# Patient Record
Sex: FEMALE | Race: WHITE | HISPANIC OR LATINO | Employment: FULL TIME | URBAN - METROPOLITAN AREA
[De-identification: names, ages, dates, MRNs, and addresses within clinical notes are randomized per-mention and may not be internally consistent; named-entity substitution may affect disease eponyms.]

---

## 2017-02-02 ENCOUNTER — OFFICE VISIT (OUTPATIENT)
Dept: MEDICAL GROUP | Facility: CLINIC | Age: 37
End: 2017-02-02
Payer: COMMERCIAL

## 2017-02-02 VITALS
OXYGEN SATURATION: 97 % | DIASTOLIC BLOOD PRESSURE: 76 MMHG | HEIGHT: 70 IN | HEART RATE: 65 BPM | SYSTOLIC BLOOD PRESSURE: 124 MMHG | RESPIRATION RATE: 16 BRPM | WEIGHT: 174 LBS | BODY MASS INDEX: 24.91 KG/M2 | TEMPERATURE: 97.4 F

## 2017-02-02 DIAGNOSIS — R19.5 MUCUS IN STOOL: ICD-10-CM

## 2017-02-02 DIAGNOSIS — Z87.19 HISTORY OF RECTAL BLEEDING: ICD-10-CM

## 2017-02-02 PROCEDURE — 99213 OFFICE O/P EST LOW 20 MIN: CPT | Performed by: NURSE PRACTITIONER

## 2017-02-02 ASSESSMENT — PATIENT HEALTH QUESTIONNAIRE - PHQ9: CLINICAL INTERPRETATION OF PHQ2 SCORE: 0

## 2017-02-02 NOTE — MR AVS SNAPSHOT
"India Hernandez   2017 7:40 AM   Office Visit   MRN: [id number]    Department:  [facility]   Dept Phone:  [phone]    Description:  Female : 1980   Provider:  FRANCISCO Grimes           Reason for Visit     Rectal Bleeding over last week, per pt it usually happens when pt eats spicy foods      Allergies as of 2017     No Known Allergies      You were diagnosed with     History of rectal bleeding   [045336]       Mucus in stool   [421032]         Vital Signs     Blood Pressure Pulse Temperature Respirations Height Weight    124/76 mmHg 65 36.3 °C (97.4 °F) 16 1.778 m (5' 10\") 78.926 kg (174 lb)    Body Mass Index Oxygen Saturation Last Menstrual Period Breastfeeding? Smoking Status       24.97 kg/m2 97% 2017 No Never Smoker        Basic Information     Date Of Birth Sex Race Ethnicity Preferred Language    1980 Female White  Origin (Sami,Sri Lankan,Citizen of [place],Raza, etc) English      Problem List              ICD-10-CM Priority Class Noted - Resolved    Uses birth control Z30.9   2015 - Present      Health Maintenance        Date Due Completion Dates    IMM DTaP/Tdap/Td Vaccine (1 - Tdap) 2001 ---    IMM INFLUENZA (1) 2016 10/16/2015    PAP SMEAR 3/1/2018 3/1/2015 (Done), 2014 (Done)    Override on 3/1/2015: Done (NL; at GYN)    Override on 2014: Done (NL/Gyn=[facility].)            Current Immunizations     Influenza TIV (IM) 10/16/2015      Below and/or attached are the medications your provider expects you to take. Review all of your home medications and newly ordered medications with your provider and/or pharmacist. Follow medication instructions as directed by your provider and/or pharmacist. Please keep your medication list with you and share with your provider. Update the information when medications are discontinued, doses are changed, or new medications (including over-the-counter products) are added; and carry medication " information at all times in the event of emergency situations     Allergies:  No Known Allergies          Medications  Valid as of: February 02, 2017 -  8:16 AM    Generic Name Brand Name Tablet Size Instructions for use    Norethindrone Acet-Ethinyl Est (Tab) MICROGESTIN 1/20 1-20 MG-MCG Take 1 Tab by mouth every day.        .                 Medicines prescribed today were sent to:     [facility]/PHARMACY #[id number] - [place], [place] - [address] [facility]    [address] [facility] [place] [address]    Phone: [phone] Fax: [phone]    Open 24 Hours?: No    [id number] - [place], [place] - [address]    [address] [place] [address]    Phone: [phone] Fax: [phone]    Open 24 Hours?: No      Medication refill instructions:       If your prescription bottle indicates you have medication refills left, it is not necessary to call your provider’s office. Please contact your pharmacy and they will refill your medication.    If your prescription bottle indicates you do not have any refills left, you may request refills at any time through one of the following ways: The online ProFounder system (except Urgent Care), by calling your provider’s office, or by asking your pharmacy to contact your provider’s office with a refill request. Medication refills are processed only during regular business hours and may not be available until the next business day. Your provider may request additional information or to have a follow-up visit with you prior to refilling your medication.   *Please Note: Medication refills are assigned a new Rx number when refilled electronically. Your pharmacy may indicate that no refills were authorized even though a new prescription for the same medication is available at the pharmacy. Please request the medicine by name with the pharmacy before contacting your provider for a refill.        Referral     A referral request has been sent to our patient care coordination department. Please allow 3-5  business days for us to process this request and contact you either by phone or mail. If you do not hear from us by the 5th business day, please call us at [phone].           [employer] Access Code: Activation code not generated  Current [employer] Status: Active

## 2017-02-02 NOTE — PROGRESS NOTES
"CC: Rectal Bleeding        HPI:     India presents today for the followin. History of rectal bleeding/Mucus in stool  Here is only intermittently whenever she eats really spicy foods she'll notice some jessie blood in her stools. No pain with bowel movement. No associated diarrhea. Some associated bloating. No abdominal pain associated. Usually will self resolve after one or 2 stools. Over the last week she did have some nitroglycerin hot wings and she states she continued to have some jessie blood with her bowel movements and some associated mucus for several days this concerned her so she called and made an appointment. Again she denied having any diarrhea or abdominal pain.    She hasn't had any of this in several days at this point.    She describes the stools as being brown. The stools themselves were not bloody or black. She states there was some blood on the stool. There was some blood in the toilet bowl. There was some maybe on the toilet paper. The mucus and blood would be what sounds like with the passage of the stool and not incorporated with the stool.    Current Outpatient Prescriptions   Medication Sig Dispense Refill   • norethindrone-ethinyl estradiol (MICROGESTIN ) 1-20 MG-MCG per tablet Take 1 Tab by mouth every day. 84 Tab 3     No current facility-administered medications for this visit.     Social History   Substance Use Topics   • Smoking status: Never Smoker    • Smokeless tobacco: Never Used   • Alcohol Use: No      Comment: monthly     I reviewed patients allergies, problem list and medications today in [facility].    ROS: Any/all pertinent positives listed in the HPI, otherwise all others reviewed are negative today.      /76 mmHg  Pulse 65  Temp(Src) 36.3 °C (97.4 °F)  Resp 16  Ht 1.778 m (5' 10\")  Wt 78.926 kg (174 lb)  BMI 24.97 kg/m2  SpO2 97%  LMP 2017  Breastfeeding? No    Exam:    Gen: Alert and oriented, No apparent distress. WDWN  Psych: A+Ox3, normal affect " and mood  Skin: Warm, dry and intact. Good turgor   No rashes in visible areas.  Eye: Conjunctiva clear, lids normal  ENMT: Lips without lesions, good dentition  Lungs: Unlabored respiratory effort.   Abd: Soft non tender, non distended. Normal active bowel sounds.    No Hepatosplenomegaly, No pulsatile masses.   Rectal exam doesn't reveal any fissures or hemorrhoids.  Fit: Negative      Assessment and Plan.   36 y.o. female with the following issues.    1. History of rectal bleeding//Mucus in stool  Stable. Currently asymptomatic. Will have her follow up with GI. Will have her go to a bland non-spicy diet in the meantime.   - REFERRAL TO GASTROENTEROLOGY      Over 50% of this 15 minute visit was spent on counseling and coordination of care regarding  review of her history and today's plan of care.

## 2017-03-23 ENCOUNTER — OFFICE VISIT (OUTPATIENT)
Dept: MEDICAL GROUP | Facility: CLINIC | Age: 37
End: 2017-03-23
Payer: COMMERCIAL

## 2017-03-23 VITALS
SYSTOLIC BLOOD PRESSURE: 124 MMHG | TEMPERATURE: 98.2 F | HEART RATE: 72 BPM | DIASTOLIC BLOOD PRESSURE: 62 MMHG | WEIGHT: 173 LBS | RESPIRATION RATE: 16 BRPM | BODY MASS INDEX: 24.77 KG/M2 | HEIGHT: 70 IN | OXYGEN SATURATION: 97 %

## 2017-03-23 DIAGNOSIS — R09.89 SINUS SYMPTOM: ICD-10-CM

## 2017-03-23 DIAGNOSIS — R05.9 COUGH: ICD-10-CM

## 2017-03-23 PROCEDURE — 99214 OFFICE O/P EST MOD 30 MIN: CPT | Performed by: NURSE PRACTITIONER

## 2017-03-23 RX ORDER — CODEINE PHOSPHATE AND GUAIFENESIN 10; 100 MG/5ML; MG/5ML
5 SOLUTION ORAL NIGHTLY PRN
Qty: 75 ML | Refills: 0 | Status: SHIPPED | OUTPATIENT
Start: 2017-03-23 | End: 2017-05-08

## 2017-03-23 RX ORDER — BENZONATATE 100 MG/1
100 CAPSULE ORAL 3 TIMES DAILY PRN
Qty: 60 CAP | Refills: 0 | Status: SHIPPED | OUTPATIENT
Start: 2017-03-23 | End: 2017-05-08

## 2017-03-23 RX ORDER — AZITHROMYCIN 250 MG/1
TABLET, FILM COATED ORAL
Qty: 1 QUANTITY SUFFICIENT | Refills: 0 | Status: SHIPPED | OUTPATIENT
Start: 2017-03-23 | End: 2017-05-08

## 2017-03-23 RX ORDER — FLUTICASONE PROPIONATE 50 MCG
2 SPRAY, SUSPENSION (ML) NASAL DAILY
Qty: 1 BOTTLE | Refills: 11 | Status: SHIPPED | OUTPATIENT
Start: 2017-03-23 | End: 2021-05-03

## 2017-03-23 NOTE — PROGRESS NOTES
"CC: Nasal Congestion        HPI:     India presents today for the followin. Sinus symptom/Cough  Here today complaining of a cough sometimes productive sometimes dry for the last week. When productive with yellowish-green sputum. Green drainage out of the nose. Positive congestion and postnasal drainage. Occasional discomfort most common in the right ear more than the left. No fevers during this week.  Coughs so much that her back hurts.  Missed 2 days of work, now back at work  Tried TheraFlu. Thinks it makes her sweat.    Current Outpatient Prescriptions   Medication Sig Dispense Refill   • fluticasone (FLONASE) 50 MCG/ACT nasal spray Spray 2 Sprays in nose every day. 1 Bottle 11   • benzonatate (TESSALON) 100 MG Cap Take 1 Cap by mouth 3 times a day as needed for Cough. 60 Cap 0   • guaifenesin-codeine (ROBITUSSIN AC) Solution oral solution Take 5 mL by mouth at bedtime as needed for Cough. 75 mL 0   • azithromycin (ZITHROMAX) 250 MG Tab Per packet 1 Quantity Sufficient 0   • norethindrone-ethinyl estradiol (MICROGESTIN ) 1-20 MG-MCG per tablet Take 1 Tab by mouth every day. 84 Tab 3     No current facility-administered medications for this visit.     Social History   Substance Use Topics   • Smoking status: Never Smoker    • Smokeless tobacco: Never Used   • Alcohol Use: No      Comment: monthly     I reviewed patients allergies, problem list and medications today in [facility].    ROS: Any/all pertinent positives listed in the HPI, otherwise all others reviewed are negative today.      /62 mmHg  Pulse 72  Temp(Src) 36.8 °C (98.2 °F)  Resp 16  Ht 1.778 m (5' 10\")  Wt 78.472 kg (173 lb)  BMI 24.82 kg/m2  SpO2 97%  LMP 2017  Breastfeeding? No    Exam:    Gen: Alert and oriented, No apparent distress. WDWN  Psych: A+Ox3, normal affect and mood  Skin: Warm, dry and intact. Good turgor   No rashes in visible areas.  Eye: Conjunctiva clear, lids normal  ENMT: Lips without lesions, good " dentition   Oropharynx clear. TMs unremarkable bilaterally. No purulent pressure over the frontal or maxillary sinuses bilaterally. Mild erythema bilateral nasal turbinates  Neck: No Lymphadenopathy, Thyromegaly, Bruits.   Trachea midline, no masses  Lungs: Clear to auscultation bilaterally, no rales or rhonchi   Unlabored respiratory effort.   CV: Regular rate and rhythm, S1, S2. No murmurs.   No Edema         Assessment and Plan.   37 y.o. female with the following issues.    1. Sinus symptom/Cough  Discussed importance of fluids, rest and hand hygiene.  May use over-the-counter anti-pyuretics and/or antitussives as needed.  Return to the office if necessary temperature, symptoms aren't resolving or new symptoms. Medications as below. Robitussin cough syrup for night only. Do not drive or mix alcohol with this. Discussed peterson pot sinus rinse and how to use this., Flonase. She'll be now she's not improving.  - fluticasone (FLONASE) 50 MCG/ACT nasal spray; Spray 2 Sprays in nose every day.  Dispense: 1 Bottle; Refill: 11  - benzonatate (TESSALON) 100 MG Cap; Take 1 Cap by mouth 3 times a day as needed for Cough.  Dispense: 60 Cap; Refill: 0  - guaifenesin-codeine (ROBITUSSIN AC) Solution oral solution; Take 5 mL by mouth at bedtime as needed for Cough.  Dispense: 75 mL; Refill: 0  - azithromycin (ZITHROMAX) 250 MG Tab; Per packet  Dispense: 1 Quantity Sufficient; Refill: 0     reviewed from state pharmacy database-Medications found to be medically necessary/appropriate.

## 2017-03-23 NOTE — MR AVS SNAPSHOT
"India Hernandez   3/23/2017 8:00 AM   Office Visit   MRN: [id number]    Department:  [facility]   Dept Phone:  [phone]    Description:  Female : 1980   Provider:  FRANCISCO Grimes           Reason for Visit     Nasal Congestion cough since last thursday      Allergies as of 3/23/2017     No Known Allergies      You were diagnosed with     Sinus symptom   [053862]       Cough   [786.2.ICD-9-CM]         Vital Signs     Blood Pressure Pulse Temperature Respirations Height Weight    124/62 mmHg 72 36.8 °C (98.2 °F) 16 1.778 m (5' 10\") 78.472 kg (173 lb)    Body Mass Index Oxygen Saturation Last Menstrual Period Breastfeeding? Smoking Status       24.82 kg/m2 97% 2017 No Never Smoker        Basic Information     Date Of Birth Sex Race Ethnicity Preferred Language    1980 Female White  Origin (Kosovan,Romanian,Bahamian,Raza, etc) English      Problem List              ICD-10-CM Priority Class Noted - Resolved    Uses birth control Z30.9   2015 - Present      Health Maintenance        Date Due Completion Dates    IMM DTaP/Tdap/Td Vaccine (1 - Tdap) 2001 ---    IMM INFLUENZA (1) 2016 10/16/2015    PAP SMEAR 3/1/2018 3/1/2015 (Done), 2014 (Done)    Override on 3/1/2015: Done (NL; at GYN)    Override on 2014: Done (NL/Gyn=[facility].)            Current Immunizations     Influenza TIV (IM) 10/16/2015      Below and/or attached are the medications your provider expects you to take. Review all of your home medications and newly ordered medications with your provider and/or pharmacist. Follow medication instructions as directed by your provider and/or pharmacist. Please keep your medication list with you and share with your provider. Update the information when medications are discontinued, doses are changed, or new medications (including over-the-counter products) are added; and carry medication information at all times in the event of emergency " situations     Allergies:  No Known Allergies          Medications  Valid as of: March 23, 2017 -  8:19 AM    Generic Name Brand Name Tablet Size Instructions for use    Azithromycin (Tab) ZITHROMAX 250 MG Per packet        Benzonatate (Cap) TESSALON 100 MG Take 1 Cap by mouth 3 times a day as needed for Cough.        Fluticasone Propionate (Suspension) FLONASE 50 MCG/ACT Spray 2 Sprays in nose every day.        Guaifenesin-Codeine (Solution) ROBITUSSIN -10 mg/5mL Take 5 mL by mouth at bedtime as needed for Cough.        Norethindrone Acet-Ethinyl Est (Tab) MICROGESTIN 1/20 1-20 MG-MCG Take 1 Tab by mouth every day.        .                 Medicines prescribed today were sent to:     [facility]/PHARMACY #[id number] - [place] - [address]    [address] [facility] [address] [facility] [address]    Phone: [phone] Fax: [phone]    Open 24 Hours?: No    [facility]/PHARMACY #[id number]  [place] - [address] [facility]    [address] [facility] [address]    Phone: [phone] Fax: [phone]    Open 24 Hours?: No      Medication refill instructions:       If your prescription bottle indicates you have medication refills left, it is not necessary to call your provider’s office. Please contact your pharmacy and they will refill your medication.    If your prescription bottle indicates you do not have any refills left, you may request refills at any time through one of the following ways: The online Dailymotion system (except Urgent Care), by calling your provider’s office, or by asking your pharmacy to contact your provider’s office with a refill request. Medication refills are processed only during regular business hours and may not be available until the next business day. Your provider may request additional information or to have a follow-up visit with you prior to refilling your medication.   *Please Note: Medication refills are assigned a new Rx number when refilled electronically. Your pharmacy may indicate that no refills were  authorized even though a new prescription for the same medication is available at the pharmacy. Please request the medicine by name with the pharmacy before contacting your provider for a refill.           [employer]Depothart Access Code: Activation code not generated  Current [employer] Status: Active

## 2017-05-01 RX ORDER — NORETHINDRONE ACETATE AND ETHINYL ESTRADIOL .02; 1 MG/1; MG/1
TABLET ORAL
Qty: 84 TAB | Refills: 3 | Status: SHIPPED | OUTPATIENT
Start: 2017-05-01 | End: 2018-04-09 | Stop reason: SDUPTHER

## 2017-05-08 ENCOUNTER — OFFICE VISIT (OUTPATIENT)
Dept: MEDICAL GROUP | Facility: CLINIC | Age: 37
End: 2017-05-08
Payer: COMMERCIAL

## 2017-05-08 VITALS
HEIGHT: 70 IN | RESPIRATION RATE: 14 BRPM | SYSTOLIC BLOOD PRESSURE: 106 MMHG | DIASTOLIC BLOOD PRESSURE: 64 MMHG | HEART RATE: 56 BPM | WEIGHT: 172 LBS | BODY MASS INDEX: 24.62 KG/M2 | TEMPERATURE: 98.4 F | OXYGEN SATURATION: 98 %

## 2017-05-08 DIAGNOSIS — L81.1 MELASMA: ICD-10-CM

## 2017-05-08 DIAGNOSIS — Z30.09 BIRTH CONTROL COUNSELING: ICD-10-CM

## 2017-05-08 PROCEDURE — 99213 OFFICE O/P EST LOW 20 MIN: CPT | Performed by: NURSE PRACTITIONER

## 2017-05-08 NOTE — MR AVS SNAPSHOT
"        India Hernandez   2017 5:00 PM   Office Visit   MRN: [id number]    Department:  [facility]   Dept Phone:  [phone]    Description:  Female : 1980   Provider:  FRANCISCO Grimes           Reason for Visit     Contraception IUD       Allergies as of 2017     No Known Allergies      You were diagnosed with     Birth control counseling   [023134]         Vital Signs     Blood Pressure Pulse Temperature Respirations Height Weight    106/64 mmHg 56 36.9 °C (98.4 °F) 14 1.778 m (5' 10\") 78.019 kg (172 lb)    Body Mass Index Oxygen Saturation Last Menstrual Period Breastfeeding? Smoking Status       24.68 kg/m2 98% 2017 No Never Smoker        Basic Information     Date Of Birth Sex Race Ethnicity Preferred Language    1980 Female White  Origin (Eritrean,Ethiopian,Peruvian,American, etc) English      Problem List              ICD-10-CM Priority Class Noted - Resolved    Uses birth control Z30.9   2015 - Present      Health Maintenance        Date Due Completion Dates    IMM DTaP/Tdap/Td Vaccine (1 - Tdap) 2001 ---    PAP SMEAR 3/1/2018 3/1/2015 (Done), 2014 (Done)    Override on 3/1/2015: Done (NL; at GYN)    Override on 2014: Done (NL/Gyn=[facility].)            Current Immunizations     Influenza TIV (IM) 10/16/2015      Below and/or attached are the medications your provider expects you to take. Review all of your home medications and newly ordered medications with your provider and/or pharmacist. Follow medication instructions as directed by your provider and/or pharmacist. Please keep your medication list with you and share with your provider. Update the information when medications are discontinued, doses are changed, or new medications (including over-the-counter products) are added; and carry medication information at all times in the event of emergency situations     Allergies:  No Known Allergies          Medications  Valid as of: " 2017 - 5:22 PM    Generic Name Brand Name Tablet Size Instructions for use    Fluticasone Propionate (Suspension) FLONASE 50 MCG/ACT Spray 2 Sprays in nose every day.        Norethindrone Acet-Ethinyl Est (Tab) MICROGESTIN 1/20 1-20 MG-MCG TAKE 1 TAB BY MOUTH EVERY DAY.        .                 Medicines prescribed today were sent to:     [facility]/PHARMACY #[id number] - [place], [place] - [address] [facility] [address]    [address] [facility] [address] [place] [address]    Phone: [phone] Fax: [phone]    Open 24 Hours?: No    [id number] - [place], [place] - [address]    [address] [facility] [place] [address]    Phone: [phone] Fax: [phone]    Open 24 Hours?: No      Medication refill instructions:       If your prescription bottle indicates you have medication refills left, it is not necessary to call your provider’s office. Please contact your pharmacy and they will refill your medication.    If your prescription bottle indicates you do not have any refills left, you may request refills at any time through one of the following ways: The online 10BestThings system (except Urgent Care), by calling your provider’s office, or by asking your pharmacy to contact your provider’s office with a refill request. Medication refills are processed only during regular business hours and may not be available until the next business day. Your provider may request additional information or to have a follow-up visit with you prior to refilling your medication.   *Please Note: Medication refills are assigned a new Rx number when refilled electronically. Your pharmacy may indicate that no refills were authorized even though a new prescription for the same medication is available at the pharmacy. Please request the medicine by name with the pharmacy before contacting your provider for a refill.        Referral     A referral request has been sent to our patient care coordination department. Please allow 3-5 business days for us to process this request and  contact you either by phone or mail. If you do not hear from us by the 5th business day, please call us at [phone].           [employer] Access Code: Activation code not generated  Current [employer] Status: Active

## 2017-05-09 NOTE — PROGRESS NOTES
"CC: Contraception        HPI:     India presents today for the followin. Melasma  Continuing to get some darker spots in her face related to using OCPs. Will like to switch birth control. Lowering the estrogen dose and her birth control has not helped greatly.    2. Birth control counseling  Considering using an IUD. Has questions regarding this.    Current Outpatient Prescriptions   Medication Sig Dispense Refill   • norethindrone-ethinyl estradiol (MICROGESTIN ) 1-20 MG-MCG per tablet TAKE 1 TAB BY MOUTH EVERY DAY. 84 Tab 3   • fluticasone (FLONASE) 50 MCG/ACT nasal spray Spray 2 Sprays in nose every day. 1 Bottle 11     No current facility-administered medications for this visit.     Social History   Substance Use Topics   • Smoking status: Never Smoker    • Smokeless tobacco: Never Used   • Alcohol Use: No      Comment: monthly     I reviewed patients allergies, problem list and medications today in [facility].    ROS: Any/all pertinent positives listed in the HPI, otherwise all others reviewed are negative today.      /64 mmHg  Pulse 56  Temp(Src) 36.9 °C (98.4 °F)  Resp 14  Ht 1.778 m (5' 10\")  Wt 78.019 kg (172 lb)  BMI 24.68 kg/m2  SpO2 98%  LMP 2017  Breastfeeding? No    Exam:    Gen: Alert and oriented, No apparent distress. WDWN  Psych: A+Ox3, normal affect and mood  Skin: Warm, dry and intact. Good turgor   No rashes in visible areas.  Melasma face  Eye: Conjunctiva clear, lids normal  ENMT: Lips without lesions, good dentition  Lungs: Unlabored respiratory effort.   Gait normal    Assessment and Plan.   37 y.o. female with the following issues.    1. Melasma  Stable.  Patient wishes to continue with her current birth control for now.    2. Birth control counseling  Reviewed different IUDs, side effects, etc. Review switching to minipill. Patient was given information IUDs and referral to gynecology was placed.  - REFERRAL TO GYNECOLOGY      Over 50% of this 15 minute visit was " spent on counseling and coordination of care regarding medication management, side effects, and complications in addition to extensive review of her history, current medications and today's plan of care.

## 2017-05-22 ENCOUNTER — HOSPITAL ENCOUNTER (OUTPATIENT)
Dept: LAB | Facility: MEDICAL CENTER | Age: 37
End: 2017-05-22
Attending: PHYSICIAN ASSISTANT
Payer: COMMERCIAL

## 2017-05-22 PROCEDURE — 87624 HPV HI-RISK TYP POOLED RSLT: CPT

## 2017-05-22 PROCEDURE — 87591 N.GONORRHOEAE DNA AMP PROB: CPT

## 2017-05-22 PROCEDURE — 87491 CHLMYD TRACH DNA AMP PROBE: CPT

## 2017-05-22 PROCEDURE — 88175 CYTOPATH C/V AUTO FLUID REDO: CPT

## 2017-05-24 LAB
C TRACH DNA GENITAL QL NAA+PROBE: NEGATIVE
CYTOLOGY REG CYTOL: NORMAL
HPV HR 12 DNA CVX QL NAA+PROBE: NEGATIVE
HPV16 DNA SPEC QL NAA+PROBE: NEGATIVE
HPV18 DNA SPEC QL NAA+PROBE: NEGATIVE
N GONORRHOEA DNA GENITAL QL NAA+PROBE: NEGATIVE
SPECIMEN SOURCE: NORMAL
SPECIMEN SOURCE: NORMAL

## 2017-08-28 ENCOUNTER — TELEPHONE (OUTPATIENT)
Dept: MEDICAL GROUP | Facility: CLINIC | Age: 37
End: 2017-08-28

## 2017-08-28 NOTE — TELEPHONE ENCOUNTER
VOICEMAIL  1. Caller Name: India                      Call Back Number: [phone] (home)     2. Message: Pharmacy only gave her 1 month and she wanted 3 month supply please. Pharmacy also gave her the wrong brand /type last time.    3. Patient approves office to leave a detailed voicemail/MyChart message: yes    4. I called pharmacy and asked about the Rx info they had. They said it was a typo on their end where they entered disp quantity as 21 vs 84 with Refill: 3. They would correct prescription and I requested they notify patient. The person I spoke with kept cutting me off so I was unable to obtain name. I did leave patient a detailed voicemail regarding end result and to please let us know should she continue to have issues.

## 2018-05-31 ENCOUNTER — HOSPITAL ENCOUNTER (OUTPATIENT)
Dept: LAB | Facility: MEDICAL CENTER | Age: 38
End: 2018-05-31
Attending: PHYSICIAN ASSISTANT
Payer: COMMERCIAL

## 2018-05-31 PROCEDURE — 87491 CHLMYD TRACH DNA AMP PROBE: CPT

## 2018-05-31 PROCEDURE — 88175 CYTOPATH C/V AUTO FLUID REDO: CPT

## 2018-05-31 PROCEDURE — 87591 N.GONORRHOEAE DNA AMP PROB: CPT

## 2018-06-02 LAB
C TRACH DNA GENITAL QL NAA+PROBE: NEGATIVE
CYTOLOGY REG CYTOL: NORMAL
N GONORRHOEA DNA GENITAL QL NAA+PROBE: NEGATIVE
SPECIMEN SOURCE: NORMAL

## 2019-02-19 ENCOUNTER — TELEPHONE (OUTPATIENT)
Dept: MEDICAL GROUP | Facility: MEDICAL CENTER | Age: 39
End: 2019-02-19

## 2019-02-19 DIAGNOSIS — R10.9 ABDOMINAL PAIN, UNSPECIFIED ABDOMINAL LOCATION: ICD-10-CM

## 2019-02-19 NOTE — TELEPHONE ENCOUNTER
VOICEMAIL  1. Caller Name: India                      Call Back Number: [phone] (home)     2. Message: Mom was diagnosed with stomach cancer and wanted to get a test for H. Pylori. The labs Giovanna ordered . Really interested in the H. Pylori test.     3. Patient approves office to leave a detailed voicemail/MyChart message: N\A

## 2019-03-06 ENCOUNTER — HOSPITAL ENCOUNTER (OUTPATIENT)
Dept: LAB | Facility: MEDICAL CENTER | Age: 39
End: 2019-03-06
Attending: NURSE PRACTITIONER
Payer: COMMERCIAL

## 2019-03-06 PROCEDURE — 83013 H PYLORI (C-13) BREATH: CPT

## 2019-03-07 ENCOUNTER — TELEPHONE (OUTPATIENT)
Dept: MEDICAL GROUP | Facility: MEDICAL CENTER | Age: 39
End: 2019-03-07

## 2019-03-07 DIAGNOSIS — A04.8 H. PYLORI INFECTION: ICD-10-CM

## 2019-03-07 LAB — UREA BREATH TEST QL: POSITIVE

## 2019-03-07 RX ORDER — OMEPRAZOLE 20 MG/1
20 CAPSULE, DELAYED RELEASE ORAL 2 TIMES DAILY
Qty: 28 CAP | Refills: 0 | Status: SHIPPED | OUTPATIENT
Start: 2019-03-07 | End: 2019-03-21

## 2019-03-07 RX ORDER — AMOXICILLIN 500 MG/1
500 CAPSULE ORAL 2 TIMES DAILY
Qty: 28 CAP | Refills: 0 | Status: SHIPPED | OUTPATIENT
Start: 2019-03-07 | End: 2019-04-16 | Stop reason: SDUPTHER

## 2019-03-07 RX ORDER — CLARITHROMYCIN 500 MG/1
500 TABLET, COATED ORAL 2 TIMES DAILY
Qty: 20 TAB | Refills: 0 | Status: SHIPPED | OUTPATIENT
Start: 2019-03-07 | End: 2019-03-21

## 2019-03-07 NOTE — TELEPHONE ENCOUNTER
Please call India    H Pylori is positive  Treatment regimen sent to [facility] on file

## 2019-03-07 NOTE — TELEPHONE ENCOUNTER
Patient said her mom has stomach cancer and that it started because she has H. Pylori. Therefore patient is wondering if she should be concerned about cancer. I told patient I would have to ask you Arlin.     [facility] [place], so I will call it in verbally. Also, patient said the pharmacy told her that they need clarification on one of the medications. Upon reviewing the meds, there is one medication that has quantity of 20 vs 28 to match the sig of 1 tab BID like the other 2 medications. Therefore I will correct the medication quantity when called in.

## 2019-04-16 ENCOUNTER — TELEPHONE (OUTPATIENT)
Dept: MEDICAL GROUP | Facility: MEDICAL CENTER | Age: 39
End: 2019-04-16

## 2019-04-16 DIAGNOSIS — A04.8 H. PYLORI INFECTION: ICD-10-CM

## 2019-04-16 RX ORDER — AMOXICILLIN 500 MG/1
500 CAPSULE ORAL 2 TIMES DAILY
Qty: 28 CAP | Refills: 0 | Status: SHIPPED | OUTPATIENT
Start: 2019-04-16 | End: 2019-04-30

## 2019-04-16 NOTE — TELEPHONE ENCOUNTER
Spoke to patient. She verbalized understanding. Cancelled rx @ [facility] and gave the verbal orders to [facility]. Confirmed with pharmacist that they are filling all 3 medications for pt.

## 2019-04-16 NOTE — TELEPHONE ENCOUNTER
Called patient to relay info. She asked what medication she was given and per chart, there were 3. She said she only received 1 rx, the amoxicillin. She asked if she would have to start treatment again, I told her I would have to ask & get back to her.  Called [facility] and they said the clarithromycin (BIAXIN) 500 mg was on hold @ [facility] in [place]. Reason unknown. Per chart notes, patient requested the [place] [facility] and therefore I had verbally called them in to [facility] in [place]. I called [facility] in [place] and they did not have a reason for them being on hold. They are going to fill the clarithromycin & omeprazole for pt now. Arlin, is this ok?

## 2019-04-16 NOTE — TELEPHONE ENCOUNTER
She should repeat the amoxicillin dose as well.  I will re-order[facility].  She will need to wait 4 week after she completes her treatment again  Make sure she knows its is 3 medications and not start treatment is she is missing one.

## 2019-06-17 ENCOUNTER — OFFICE VISIT (OUTPATIENT)
Dept: MEDICAL GROUP | Facility: MEDICAL CENTER | Age: 39
End: 2019-06-17
Payer: COMMERCIAL

## 2019-06-17 VITALS
TEMPERATURE: 98.2 F | HEART RATE: 61 BPM | OXYGEN SATURATION: 95 % | DIASTOLIC BLOOD PRESSURE: 62 MMHG | WEIGHT: 173 LBS | SYSTOLIC BLOOD PRESSURE: 100 MMHG | HEIGHT: 70 IN | BODY MASS INDEX: 24.77 KG/M2 | RESPIRATION RATE: 14 BRPM

## 2019-06-17 DIAGNOSIS — I83.812 VARICOSE VEINS OF LEFT LOWER EXTREMITY WITH PAIN: ICD-10-CM

## 2019-06-17 DIAGNOSIS — D22.9 CHANGE IN COLOR OF SKIN MOLE: ICD-10-CM

## 2019-06-17 DIAGNOSIS — A04.8 H. PYLORI INFECTION: ICD-10-CM

## 2019-06-17 DIAGNOSIS — L30.9 DERMATITIS: ICD-10-CM

## 2019-06-17 PROCEDURE — 99214 OFFICE O/P EST MOD 30 MIN: CPT | Performed by: NURSE PRACTITIONER

## 2019-06-17 RX ORDER — TRIAMCINOLONE ACETONIDE 1 MG/G
1 CREAM TOPICAL 2 TIMES DAILY
Qty: 1 TUBE | Refills: 3 | Status: SHIPPED | OUTPATIENT
Start: 2019-06-17 | End: 2021-05-03

## 2019-06-17 ASSESSMENT — PATIENT HEALTH QUESTIONNAIRE - PHQ9: CLINICAL INTERPRETATION OF PHQ2 SCORE: 0

## 2019-06-18 NOTE — PROGRESS NOTES
CC: Rash        HPI:     India presents today for the followin. H. pylori infection  Diagnosed with a active H. pylori infection about 3 months ago.  We initially did send triple treatment however there was an error with her pharmacy and she only really perceive the amoxicillin.  We repeated treatment with triple therapy, she verifies she took all 3 different medications for the 10-day time.  And is currently waiting for her repeat H. pylori test which is all reordered.  This was ordered because the patient's mother was diagnosed with stomach cancer related to H. pylori    2. Dermatitis  Primarily because she is had a rash in the middle dorsal aspect of her left thigh for a few months.  Worsens if she dries the skin out.  Sometimes is more itchy but it does have peeling type skin.  Similar rash on her right lower back which resolved with over-the-counter hydrocortisone.  Mildly itchy    3. Varicose veins of left lower extremity with pain  Does have some chronic varicose veins in her left lower leg since pregnancy.  She did consult with vascular previously and feels her becoming slightly more torturous and they are uncomfortable when exercising in the gym    4. Change in color of skin mole  Has a mole on her right upper back that years ago was recommended to be removed by dermatology.  She is unable to see the area so she is unsure if it is changed.    Current Outpatient Prescriptions   Medication Sig Dispense Refill   • triamcinolone acetonide (KENALOG) 0.1 % Cream Apply 1 Application to affected area(s) 2 times a day. 1 Tube 3   • norethindrone-ethinyl estradiol (MICROGESTIN 1/20) 1-20 MG-MCG per tablet TAKE 1 TAB BY MOUTH EVERY DAY. 84 Tab 2   • fluticasone (FLONASE) 50 MCG/ACT nasal spray Spray 2 Sprays in nose every day. 1 Bottle 11     No current facility-administered medications for this visit.      Social History   Substance Use Topics   • Smoking status: Never Smoker   • Smokeless tobacco: Never Used  "  • Alcohol use No      Comment: monthly     I reviewed patients allergies, problem list and medications today in [facility].    ROS: Any/all pertinent positives listed in the HPI, otherwise all others reviewed are negative today.      /62 (BP Location: Left arm, Patient Position: Sitting, BP Cuff Size: Adult)   Pulse 61   Temp 36.8 °C (98.2 °F) (Temporal)   Resp 14   Ht 1.778 m (5' 10\")   Wt 78.5 kg (173 lb)   SpO2 95%   BMI 24.82 kg/m²     Exam:   Gen: Alert and oriented, No apparent distress. WDWN  Psych: A+Ox3, normal affect and mood  Skin: Warm, dry and intact. Good turgor  Dorsal aspect of right thigh shows an approximate 10 mm mildly erythematous well circumcised macular erythematous lesion.  Mildly flaky.  No distinct border.  No papules pustules vesicles.  Right upper back shows an approximate 6 cm soft tan-colored nevus with areas of dark brown and irregular borders.  Otherwise skin appears non-irritated nonfriable  Eye: Conjunctiva clear, lids normal  ENMT: Lips without lesions, good dentition  Lungs: Clear to auscultation bilaterally, no rales or rhonchi   Unlabored respiratory effort.   CV: Regular rate and rhythm, S1, S2. No murmurs.   No Edema        Assessment and Plan.   39 y.o. female with the following issues.    1. H. pylori infection  Pending repeat test of cure    2. Dermatitis  Discussed emollients.  Triamcinolone to the area  - triamcinolone acetonide (KENALOG) 0.1 % Cream; Apply 1 Application to affected area(s) 2 times a day.  Dispense: 1 Tube; Refill: 3    3. Varicose veins of left lower extremity with pain  Referral placed  - REFERRAL TO VASCULAR SURGERY    4. Change in color of skin mole  Referral placed  - REFERRAL TO DERMATOLOGY          "

## 2019-06-19 ENCOUNTER — HOSPITAL ENCOUNTER (OUTPATIENT)
Dept: LAB | Facility: MEDICAL CENTER | Age: 39
End: 2019-06-19
Attending: NURSE PRACTITIONER
Payer: COMMERCIAL

## 2019-06-19 PROCEDURE — 83013 H PYLORI (C-13) BREATH: CPT

## 2019-06-21 LAB — UREA BREATH TEST QL: NEGATIVE

## 2019-09-03 ENCOUNTER — APPOINTMENT (RX ONLY)
Dept: URBAN - METROPOLITAN AREA CLINIC 22 | Facility: CLINIC | Age: 39
Setting detail: DERMATOLOGY
End: 2019-09-03

## 2019-09-03 DIAGNOSIS — L81.1 CHLOASMA: ICD-10-CM

## 2019-09-03 DIAGNOSIS — Q826 OTHER SPECIFIED ANOMALIES OF SKIN: ICD-10-CM

## 2019-09-03 DIAGNOSIS — D18.0 HEMANGIOMA: ICD-10-CM

## 2019-09-03 DIAGNOSIS — Q819 OTHER SPECIFIED ANOMALIES OF SKIN: ICD-10-CM

## 2019-09-03 DIAGNOSIS — L259 CONTACT DERMATITIS AND OTHER ECZEMA, UNSPECIFIED CAUSE: ICD-10-CM

## 2019-09-03 DIAGNOSIS — Z71.89 OTHER SPECIFIED COUNSELING: ICD-10-CM

## 2019-09-03 DIAGNOSIS — D22 MELANOCYTIC NEVI: ICD-10-CM

## 2019-09-03 DIAGNOSIS — L81.4 OTHER MELANIN HYPERPIGMENTATION: ICD-10-CM

## 2019-09-03 DIAGNOSIS — Q828 OTHER SPECIFIED ANOMALIES OF SKIN: ICD-10-CM

## 2019-09-03 PROBLEM — D48.5 NEOPLASM OF UNCERTAIN BEHAVIOR OF SKIN: Status: ACTIVE | Noted: 2019-09-03

## 2019-09-03 PROBLEM — L30.8 OTHER SPECIFIED DERMATITIS: Status: ACTIVE | Noted: 2019-09-03

## 2019-09-03 PROBLEM — D22.5 MELANOCYTIC NEVI OF TRUNK: Status: ACTIVE | Noted: 2019-09-03

## 2019-09-03 PROBLEM — Q82.8 OTHER SPECIFIED CONGENITAL MALFORMATIONS OF SKIN: Status: ACTIVE | Noted: 2019-09-03

## 2019-09-03 PROBLEM — D18.01 HEMANGIOMA OF SKIN AND SUBCUTANEOUS TISSUE: Status: ACTIVE | Noted: 2019-09-03

## 2019-09-03 PROCEDURE — ? PRESCRIPTION

## 2019-09-03 PROCEDURE — 11102 TANGNTL BX SKIN SINGLE LES: CPT

## 2019-09-03 PROCEDURE — ? TREATMENT REGIMEN

## 2019-09-03 PROCEDURE — ? PHOTO-DOCUMENTATION

## 2019-09-03 PROCEDURE — ? COUNSELING

## 2019-09-03 PROCEDURE — ? ADDITIONAL NOTES

## 2019-09-03 PROCEDURE — 99203 OFFICE O/P NEW LOW 30 MIN: CPT | Mod: 25

## 2019-09-03 PROCEDURE — ? BIOPSY BY SHAVE METHOD

## 2019-09-03 RX ORDER — TRIAMCINOLONE ACETONIDE 1 MG/G
CREAM TOPICAL BID
Qty: 1 | Refills: 1 | Status: ERX | COMMUNITY
Start: 2019-09-03

## 2019-09-03 RX ADMIN — TRIAMCINOLONE ACETONIDE: 1 CREAM TOPICAL at 22:37

## 2019-09-03 ASSESSMENT — LOCATION SIMPLE DESCRIPTION DERM
LOCATION SIMPLE: RIGHT ELBOW
LOCATION SIMPLE: LEFT CHEEK
LOCATION SIMPLE: LEFT ELBOW
LOCATION SIMPLE: LEFT UPPER BACK
LOCATION SIMPLE: RIGHT POSTERIOR UPPER ARM
LOCATION SIMPLE: RIGHT UPPER BACK
LOCATION SIMPLE: LEFT POSTERIOR UPPER ARM
LOCATION SIMPLE: ABDOMEN

## 2019-09-03 ASSESSMENT — LOCATION DETAILED DESCRIPTION DERM
LOCATION DETAILED: RIGHT PROXIMAL POSTERIOR UPPER ARM
LOCATION DETAILED: RIGHT SUPERIOR UPPER BACK
LOCATION DETAILED: LEFT SUPERIOR MEDIAL UPPER BACK
LOCATION DETAILED: LEFT PROXIMAL POSTERIOR UPPER ARM
LOCATION DETAILED: EPIGASTRIC SKIN
LOCATION DETAILED: LEFT CENTRAL MALAR CHEEK
LOCATION DETAILED: LEFT MID-UPPER BACK
LOCATION DETAILED: LEFT ELBOW
LOCATION DETAILED: RIGHT ELBOW

## 2019-09-03 ASSESSMENT — LOCATION ZONE DERM
LOCATION ZONE: FACE
LOCATION ZONE: ARM
LOCATION ZONE: TRUNK

## 2019-09-03 NOTE — PROCEDURE: ADDITIONAL NOTES
Additional Notes: Declined prescription treatment.  Samples of Ander PITTS UV Clear provided.
Detail Level: Generalized

## 2019-09-03 NOTE — PROCEDURE: BIOPSY BY SHAVE METHOD
Detail Level: Detailed
Curettage Text: The wound bed was treated with curettage after the biopsy was performed.
Lab: [id number]
Billing Type: Third-Party Bill
Post-Care Instructions: I reviewed with the patient in detail post-care instructions. Patient is to keep the biopsy site dry overnight, and then apply bacitracin twice daily until healed. Patient may apply hydrogen peroxide soaks to remove any crusting.
Destruction After The Procedure: No
Cryotherapy Text: The wound bed was treated with cryotherapy after the biopsy was performed.
Lab Facility: 
Size Of Lesion In Cm: 0
Anesthesia Type: 1% lidocaine with 1:100,000 epinephrine and a 1:3 solution of 8.4% sodium bicarbonate
Notification Instructions: Patient will be notified of biopsy results. However, patient instructed to call the office if not contacted within 2 weeks.
Wound Care: Vaseline
Electrodesiccation Text: The wound bed was treated with electrodesiccation after the biopsy was performed.
Depth Of Biopsy: dermis
Biopsy Type: H and E
Consent: Written consent was obtained and risks were reviewed including but not limited to scarring, infection, bleeding, scabbing, incomplete removal, nerve damage and allergy to anesthesia.
Type Of Destruction Used: Curettage
Electrodesiccation And Curettage Text: The wound bed was treated with electrodesiccation and curettage after the biopsy was performed.
Anesthesia Volume In Cc: 1
Render Post-Care Instructions In Note?: yes
Dressing: bandage
Silver Nitrate Text: The wound bed was treated with silver nitrate after the biopsy was performed.
Biopsy Method: Personna blade
Hemostasis: Drysol

## 2019-11-18 ENCOUNTER — HOSPITAL ENCOUNTER (OUTPATIENT)
Dept: LAB | Facility: MEDICAL CENTER | Age: 39
End: 2019-11-18
Attending: PHYSICIAN ASSISTANT
Payer: COMMERCIAL

## 2019-11-18 LAB — CYTOLOGY REG CYTOL: NORMAL

## 2019-11-18 PROCEDURE — 88175 CYTOPATH C/V AUTO FLUID REDO: CPT

## 2021-01-29 ENCOUNTER — HOSPITAL ENCOUNTER (OUTPATIENT)
Facility: MEDICAL CENTER | Age: 41
End: 2021-01-29
Attending: PHYSICIAN ASSISTANT
Payer: COMMERCIAL

## 2021-01-29 PROCEDURE — 87624 HPV HI-RISK TYP POOLED RSLT: CPT

## 2021-01-29 PROCEDURE — 88175 CYTOPATH C/V AUTO FLUID REDO: CPT

## 2021-02-02 LAB
CYTOLOGY REG CYTOL: NORMAL
HPV HR 12 DNA CVX QL NAA+PROBE: NEGATIVE
HPV16 DNA SPEC QL NAA+PROBE: NEGATIVE
HPV18 DNA SPEC QL NAA+PROBE: NEGATIVE
SPECIMEN SOURCE: NORMAL

## 2021-05-03 ENCOUNTER — OFFICE VISIT (OUTPATIENT)
Dept: MEDICAL GROUP | Facility: IMAGING CENTER | Age: 41
End: 2021-05-03
Payer: COMMERCIAL

## 2021-05-03 VITALS
BODY MASS INDEX: 25.05 KG/M2 | TEMPERATURE: 97.8 F | DIASTOLIC BLOOD PRESSURE: 70 MMHG | HEIGHT: 70 IN | HEART RATE: 56 BPM | WEIGHT: 175 LBS | RESPIRATION RATE: 14 BRPM | OXYGEN SATURATION: 97 % | SYSTOLIC BLOOD PRESSURE: 98 MMHG

## 2021-05-03 DIAGNOSIS — Z76.89 ENCOUNTER TO ESTABLISH CARE WITH NEW DOCTOR: ICD-10-CM

## 2021-05-03 DIAGNOSIS — T14.8XXA BRUISING: ICD-10-CM

## 2021-05-03 DIAGNOSIS — G89.29 CHRONIC RIGHT-SIDED LOW BACK PAIN WITHOUT SCIATICA: ICD-10-CM

## 2021-05-03 DIAGNOSIS — M54.50 CHRONIC RIGHT-SIDED LOW BACK PAIN WITHOUT SCIATICA: ICD-10-CM

## 2021-05-03 DIAGNOSIS — N39.3 STRESS INCONTINENCE IN FEMALE: ICD-10-CM

## 2021-05-03 PROCEDURE — 99214 OFFICE O/P EST MOD 30 MIN: CPT | Performed by: NURSE PRACTITIONER

## 2021-05-03 ASSESSMENT — PATIENT HEALTH QUESTIONNAIRE - PHQ9: CLINICAL INTERPRETATION OF PHQ2 SCORE: 0

## 2021-05-03 ASSESSMENT — PAIN SCALES - GENERAL: PAINLEVEL: NO PAIN

## 2021-05-03 NOTE — PROGRESS NOTES
Subjective:   CC: Establish Care and Low Back Pain (right side, intermittent, a few months, usually in the mornings )    HISTORY OF THE PRESENT ILLNESS: Patient is a 41 y.o. female. Her prior PCP was LEO Hurtado, last seen 6/2019.  Patient has history of allergies and varicose veins. Patient is here today to establish care and discuss:     Reports that she had vein removal of her left lower leg in August 2020 at [facility].  Reports that she was told that she would experience bruising for at least 6 months.  States that she continues to have a bruise where she had procedure.  States that bruising is improving, but she still continues to have bruising and a larger lump where she had judy removed.  States that she has not followed up with [facility].  Denies any pain at site.    Reports that she has intermittent urinary incontinence when she is running and/or sneezing.  Reports that she has had 1 child vaginally.  Reports with exercise and she has noticed a slight increase in tone and decreasing incontinence.    Reports that she has had lower back pain on her right side for the last few months on and off.  States when pain is its worst it is a 6 out of 10.  States that she feels increased pain in the morning upon rising.  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  States that she does turn to the right a lot during her sitting. Denies any sciatic-like pain.  Denies any injury or trauma to the area.  Denies any red flag symptoms, including urinary and stool incontinence, urinary retention, and saddle paresthesia.    Allergies: Seasonal    Current Outpatient Medications Ordered in Epic   Medication Sig Dispense Refill   • levonorgestrel (MIRENA) 52 mg (20 mcg/24 hr) IUD 1 Each by Intrauterine route one time.     • Multiple Vitamins-Minerals (CENTRUM ADULTS PO) Take 1 tablet by  "mouth every day.       No current [facility]-ordered facility-administered medications on file.     Past Medical History:   Diagnosis Date   • Allergy    • Varicose vein of leg      Past Surgical History:   Procedure Laterality Date   • OTHER      vein removed     Social History     Tobacco Use   • Smoking status: Never Smoker   • Smokeless tobacco: Never Used   Substance Use Topics   • Alcohol use: Yes     Comment: occasionally   • Drug use: No     Social History     Social History Narrative   • Not on file     Family History   Problem Relation Age of Onset   • Arthritis Mother    • No Known Problems Sister    • No Known Problems Brother    • Cancer Maternal Grandmother         unk type   • No Known Problems Maternal Grandfather    • Arthritis Paternal Grandfather    • Diabetes Paternal Uncle      Health Maintenance: Completed.    ROS:   Constitutional: Denies fever, chills, night sweats, weight loss/gain and/or malaise/fatigue.   HENT: Denies nasal congestion, sore throat, hearing loss, enlarged thyroid, or difficulty swallowing.    Eyes: Denies changes in vision, pain. Does wear corrective wear, glasses  Respiratory: Denies cough, SOB at rest or activity.    Cardiovascular: Denies tachycardia, chest pain, palpitations, or  leg swelling.   Gastrointestinal: Denies N/V/C/D, abdominal pain, loss appetite, reflux, or hematochezia.  Genitourinary: Denies difficulty voiding, dysuria, nocturia, or hematuria.  Incontinence, see HPI.  Skin: Negative for rash or worrisome moles.   Neurological: Negative for dizziness, focal weakness and headaches.   Endo/Heme/Allergies: Denies bleed easily, allergies.  Bruise see HPI.  Psychiatric/Behavioral: Denies depression, nervous/anxious, difficulty sleeping.  MSK: Low back pain, see HPI.    Objective:   Exam: BP (!) 98/70 (BP Location: Left arm, Patient Position: Sitting, BP Cuff Size: Adult)   Pulse (!) 56   Temp 36.6 °C (97.8 °F) (Temporal)   Resp 14   Ht 1.778 m (5' 10\")   Wt 79.4 " kg (175 lb)   SpO2 97%  Body mass index is 25.11 kg/m².    General: Normal appearing. No distress.  HEENT: Normocephalic. Eyes conjunctiva clear lids without ptosis, PERRLA, ears normal shape and contour. Oral and nasal examine deferred due to current COVID-19 outbreak, no acute concerns. Patient wore a mask during visit.  Neck: Supple without JVD or abnormal masses. Small soft mobile thyroid palpated, no nodules palpated, non-tender.  Pulmonary: Clear to ausculation.  Normal effort. No rales, ronchi, or wheezing.  Cardiovascular: Regular rate and rhythm without murmur. Pedal and radial pulses are intact and equal bilaterally.  Abdomen: Soft, nontender, nondistended. Normal bowel sounds. Liver and spleen are not palpable.  Neurologic: Grossly non-focal.  Lymph: No cervical, submandibular, or supraclavicular lymph nodes are palpable.  Skin: Warm and dry.  No obvious lesions.  Bruising noted at left lower extremity along shin.  Skin is intact.  Soft mobile mass noted in line of bruising where previous vein was.  No pain with light palpation.  Musculoskeletal: Normal gait. No extremity cyanosis, clubbing, or edema. DTR+2.  No mass, edema, pain on and or along spine.  Muscle tension noted on right side of spine and lower back.  Negative straight leg test.  Negative heel/toe test.  Psych: Normal mood and affect. Alert and oriented x3. Judgment and insight is normal.    Assessment & Plan:   1. Encounter to establish care with new doctor  Reviewed with patient medication use and side effects. Medical, past, surgical history reviewed with patient. Discussed with patient the risk and benefits of receiving vaccines. Discussed CDC recommendations for immunizations and USPSTF guidelines for screening exams.  Verbalized understanding. Encouraged patient to wash hands regularly and avoid sick contacts while supporting immune system.  Discussed the overall benefit of a well-balanced diet, regular exercise, and stress management,  verbalized understanding. Instructed to RTC in 6 mo for annual physical.    2. Stress incontinence in female  This is a chronic stable condition.  Instructed to complete Kegel exercises multiple times during the day to further assist in strengthening pelvic floor.  Discussed with patient possibility of referring to physical therapy for further pelvic floor training, declined at this time, but will consider at future date.    3. Chronic right-sided low back pain without sciatica  This is a stable condition.  Discussed physical assessment findings with patient, verbalized understanding. Reviewed red flag symptoms with patient, verbalized understanding and knows to seek emergency services if she experiences symptoms. Discussed with patient this injury appears to muscular in origin due to activity of increased sitting at desk. Verbalized understanding. Instructed to take Ibuprofen 200-600 mg as tolerated TID for no longer than 3 days with food to prevent GI upset and risk of GI bleeding, verbalized understanding. Instructed to use ice therapy 4 times a day for 20 minutes at a time. Encouraged to remain active as tolerated.  Discussed using OTC Arnica or Biofreeze spray for further relief. Discussed the importance of stretching and increasing core strength. Reviewed different exercises and resources for exercises. Discussed taking frequent breaks while a work and moving throughout the day, verbalized understanding. Declined referral to PT at this time. Discussed RTC in 4-6 weeks if pain persist will discuss PT referral and imaging at that time.     4. Bruising  This is a chronic stable condition.  Instructed to follow-up with [facility] for further evaluation of concern.    Return in about 4 weeks (around 5/31/2021) for if back pain continues.    Please note that this dictation was created using voice recognition software. I have made every reasonable attempt to correct obvious errors, but I expect that  there are errors of grammar and possibly content that I did not discover before finalizing the note.

## 2021-05-04 ENCOUNTER — TELEPHONE (OUTPATIENT)
Dept: MEDICAL GROUP | Facility: IMAGING CENTER | Age: 41
End: 2021-05-04

## 2021-05-04 NOTE — TELEPHONE ENCOUNTER
Please request medical records from [facility].  Patient states that she had a vein removed there in August 2020.  Christine Underwood, APRN-C

## 2022-08-12 NOTE — TELEPHONE ENCOUNTER
VOICEMAIL  1. Caller Name: India                      Call Back Number: [phone] (home)     2. Message: Was positive for H. Pylori. Finished her meds 2 weeks ago for it. Wants to repeat lab please.    3. Patient approves office to leave a detailed voicemail/MyChart message: N\A    4. There is an open order. Can she use that one? Does she have to wait x amount of time?        Hide Include Location In Plan Question?: No Detail Level: Generalized

## 2023-01-13 ENCOUNTER — HOSPITAL ENCOUNTER (OUTPATIENT)
Dept: LAB | Facility: MEDICAL CENTER | Age: 43
End: 2023-01-13
Attending: NURSE PRACTITIONER
Payer: COMMERCIAL

## 2023-01-13 LAB
25(OH)D3 SERPL-MCNC: 17 NG/ML (ref 30–100)
ALBUMIN SERPL BCP-MCNC: 4.3 G/DL (ref 3.2–4.9)
ALBUMIN/GLOB SERPL: 1.7 G/DL
ALP SERPL-CCNC: 49 U/L (ref 30–99)
ALT SERPL-CCNC: 12 U/L (ref 2–50)
ANION GAP SERPL CALC-SCNC: 10 MMOL/L (ref 7–16)
AST SERPL-CCNC: 19 U/L (ref 12–45)
BASOPHILS # BLD AUTO: 0 % (ref 0–1.8)
BASOPHILS # BLD: 0 K/UL (ref 0–0.12)
BILIRUB SERPL-MCNC: 0.5 MG/DL (ref 0.1–1.5)
BUN SERPL-MCNC: 11 MG/DL (ref 8–22)
CALCIUM ALBUM COR SERPL-MCNC: 9 MG/DL (ref 8.5–10.5)
CALCIUM SERPL-MCNC: 9.2 MG/DL (ref 8.5–10.5)
CHLORIDE SERPL-SCNC: 108 MMOL/L (ref 96–112)
CHOLEST SERPL-MCNC: 166 MG/DL (ref 100–199)
CO2 SERPL-SCNC: 25 MMOL/L (ref 20–33)
CREAT SERPL-MCNC: 0.65 MG/DL (ref 0.5–1.4)
EOSINOPHIL # BLD AUTO: 0.06 K/UL (ref 0–0.51)
EOSINOPHIL NFR BLD: 0.8 % (ref 0–6.9)
ERYTHROCYTE [DISTWIDTH] IN BLOOD BY AUTOMATED COUNT: 42.8 FL (ref 35.9–50)
EST. AVERAGE GLUCOSE BLD GHB EST-MCNC: 103 MG/DL
FASTING STATUS PATIENT QL REPORTED: NORMAL
GFR SERPLBLD CREATININE-BSD FMLA CKD-EPI: 113 ML/MIN/1.73 M 2
GLOBULIN SER CALC-MCNC: 2.6 G/DL (ref 1.9–3.5)
GLUCOSE SERPL-MCNC: 90 MG/DL (ref 65–99)
HBA1C MFR BLD: 5.2 % (ref 4–5.6)
HCT VFR BLD AUTO: 37.9 % (ref 37–47)
HDLC SERPL-MCNC: 58 MG/DL
HGB BLD-MCNC: 12.7 G/DL (ref 12–16)
LDLC SERPL CALC-MCNC: 96 MG/DL
LYMPHOCYTES # BLD AUTO: 1.64 K/UL (ref 1–4.8)
LYMPHOCYTES NFR BLD: 21.9 % (ref 22–41)
MANUAL DIFF BLD: ABNORMAL
MCH RBC QN AUTO: 31.1 PG (ref 27–33)
MCHC RBC AUTO-ENTMCNC: 33.5 G/DL (ref 33.6–35)
MCV RBC AUTO: 92.7 FL (ref 81.4–97.8)
MONOCYTES # BLD AUTO: 0.76 K/UL (ref 0–0.85)
MONOCYTES NFR BLD AUTO: 10.1 % (ref 0–13.4)
NEUTROPHILS # BLD AUTO: 5.04 K/UL (ref 2–7.15)
NEUTROPHILS NFR BLD: 67.2 % (ref 44–72)
PLATELET # BLD AUTO: 227 K/UL (ref 164–446)
PMV BLD AUTO: 10.7 FL (ref 9–12.9)
POTASSIUM SERPL-SCNC: 4.2 MMOL/L (ref 3.6–5.5)
PROT SERPL-MCNC: 6.9 G/DL (ref 6–8.2)
RBC # BLD AUTO: 4.09 M/UL (ref 4.2–5.4)
SODIUM SERPL-SCNC: 143 MMOL/L (ref 135–145)
TRIGL SERPL-MCNC: 60 MG/DL (ref 0–149)
TSH SERPL DL<=0.005 MIU/L-ACNC: 1.97 UIU/ML (ref 0.38–5.33)
WBC # BLD AUTO: 7.5 K/UL (ref 4.8–10.8)

## 2023-01-13 PROCEDURE — 80053 COMPREHEN METABOLIC PANEL: CPT

## 2023-01-13 PROCEDURE — 83036 HEMOGLOBIN GLYCOSYLATED A1C: CPT

## 2023-01-13 PROCEDURE — 84443 ASSAY THYROID STIM HORMONE: CPT

## 2023-01-13 PROCEDURE — 36415 COLL VENOUS BLD VENIPUNCTURE: CPT

## 2023-01-13 PROCEDURE — 82306 VITAMIN D 25 HYDROXY: CPT

## 2023-01-13 PROCEDURE — 85007 BL SMEAR W/DIFF WBC COUNT: CPT

## 2023-01-13 PROCEDURE — 80061 LIPID PANEL: CPT

## 2023-01-13 PROCEDURE — 85027 COMPLETE CBC AUTOMATED: CPT

## 2023-02-24 ENCOUNTER — HOSPITAL ENCOUNTER (OUTPATIENT)
Dept: RADIOLOGY | Facility: MEDICAL CENTER | Age: 43
End: 2023-02-24
Attending: NURSE PRACTITIONER
Payer: COMMERCIAL

## 2023-02-24 DIAGNOSIS — Z12.39 SCREENING BREAST EXAMINATION: ICD-10-CM

## 2023-02-24 PROCEDURE — 77063 BREAST TOMOSYNTHESIS BI: CPT

## 2023-03-03 ENCOUNTER — HOSPITAL ENCOUNTER (OUTPATIENT)
Dept: RADIOLOGY | Facility: MEDICAL CENTER | Age: 43
End: 2023-03-03
Attending: NURSE PRACTITIONER
Payer: COMMERCIAL

## 2023-03-03 DIAGNOSIS — R92.8 ABNORMAL MAMMOGRAM: ICD-10-CM

## 2023-03-03 PROCEDURE — 76642 ULTRASOUND BREAST LIMITED: CPT | Mod: LT

## 2023-03-10 ENCOUNTER — HOSPITAL ENCOUNTER (OUTPATIENT)
Dept: RADIOLOGY | Facility: MEDICAL CENTER | Age: 43
End: 2023-03-10
Attending: NURSE PRACTITIONER
Payer: COMMERCIAL

## 2023-03-10 DIAGNOSIS — R92.8 ABNORMAL FINDINGS ON DIAGNOSTIC IMAGING OF BREAST: ICD-10-CM

## 2023-03-10 LAB — PATHOLOGY CONSULT NOTE: NORMAL

## 2023-03-10 PROCEDURE — 88173 CYTOPATH EVAL FNA REPORT: CPT

## 2023-03-10 PROCEDURE — 88305 TISSUE EXAM BY PATHOLOGIST: CPT

## 2023-03-10 PROCEDURE — 19000 PUNCTURE ASPIR CYST BREAST: CPT | Mod: LT

## 2023-03-13 ENCOUNTER — TELEPHONE (OUTPATIENT)
Dept: RADIOLOGY | Facility: MEDICAL CENTER | Age: 43
End: 2023-03-13
Payer: COMMERCIAL

## 2023-03-14 ENCOUNTER — TELEPHONE (OUTPATIENT)
Dept: RADIOLOGY | Facility: MEDICAL CENTER | Age: 43
End: 2023-03-14
Payer: COMMERCIAL

## 2023-03-14 NOTE — TELEPHONE ENCOUNTER
Left a msg for Chanel Boateng, KALYANIN.P.: to make sure office received breast bx & cyst asp results and to make sure provider places referral to Mony Leblanc, who manages high risk lesions.

## 2023-03-22 ENCOUNTER — HOSPITAL ENCOUNTER (OUTPATIENT)
Dept: LAB | Facility: MEDICAL CENTER | Age: 43
End: 2023-03-22
Attending: NURSE PRACTITIONER
Payer: COMMERCIAL

## 2023-03-22 LAB
BASOPHILS # BLD AUTO: 0 % (ref 0–1.8)
BASOPHILS # BLD: 0 K/UL (ref 0–0.12)
EOSINOPHIL # BLD AUTO: 0.15 K/UL (ref 0–0.51)
EOSINOPHIL NFR BLD: 1.7 % (ref 0–6.9)
ERYTHROCYTE [DISTWIDTH] IN BLOOD BY AUTOMATED COUNT: 40.9 FL (ref 35.9–50)
FOLATE SERPL-MCNC: 19 NG/ML
HCT VFR BLD AUTO: 40.3 % (ref 37–47)
HGB BLD-MCNC: 13.9 G/DL (ref 12–16)
IRON SATN MFR SERPL: 70 % (ref 15–55)
IRON SERPL-MCNC: 162 UG/DL (ref 40–170)
LYMPHOCYTES # BLD AUTO: 1.84 K/UL (ref 1–4.8)
LYMPHOCYTES NFR BLD: 20.7 % (ref 22–41)
MANUAL DIFF BLD: ABNORMAL
MCH RBC QN AUTO: 31.4 PG (ref 27–33)
MCHC RBC AUTO-ENTMCNC: 34.5 G/DL (ref 33.6–35)
MCV RBC AUTO: 91 FL (ref 81.4–97.8)
MONOCYTES # BLD AUTO: 1.08 K/UL (ref 0–0.85)
MONOCYTES NFR BLD AUTO: 12.1 % (ref 0–13.4)
NEUTROPHILS # BLD AUTO: 1.84 K/UL (ref 2–7.15)
NEUTROPHILS NFR BLD: 65.5 % (ref 44–72)
PLATELET # BLD AUTO: 221 K/UL (ref 164–446)
PMV BLD AUTO: 10.5 FL (ref 9–12.9)
RBC # BLD AUTO: 4.43 M/UL (ref 4.2–5.4)
TIBC SERPL-MCNC: 230 UG/DL (ref 250–450)
UIBC SERPL-MCNC: 68 UG/DL (ref 110–370)
VIT B12 SERPL-MCNC: 441 PG/ML (ref 211–911)
WBC # BLD AUTO: 8.9 K/UL (ref 4.8–10.8)

## 2023-03-22 PROCEDURE — 83550 IRON BINDING TEST: CPT

## 2023-03-22 PROCEDURE — 83540 ASSAY OF IRON: CPT

## 2023-03-22 PROCEDURE — 82746 ASSAY OF FOLIC ACID SERUM: CPT

## 2023-03-22 PROCEDURE — 85007 BL SMEAR W/DIFF WBC COUNT: CPT

## 2023-03-22 PROCEDURE — 85027 COMPLETE CBC AUTOMATED: CPT

## 2023-03-22 PROCEDURE — 82607 VITAMIN B-12: CPT

## 2023-03-22 PROCEDURE — 36415 COLL VENOUS BLD VENIPUNCTURE: CPT

## 2023-04-21 ENCOUNTER — HOSPITAL ENCOUNTER (OUTPATIENT)
Dept: HEMATOLOGY ONCOLOGY | Facility: MEDICAL CENTER | Age: 43
End: 2023-04-21
Attending: INTERNAL MEDICINE
Payer: COMMERCIAL

## 2023-04-21 ENCOUNTER — HOSPITAL ENCOUNTER (OUTPATIENT)
Dept: HEMATOLOGY ONCOLOGY | Facility: MEDICAL CENTER | Age: 43
End: 2023-04-21
Attending: INTERNAL MEDICINE

## 2023-04-21 VITALS
SYSTOLIC BLOOD PRESSURE: 86 MMHG | TEMPERATURE: 99.3 F | HEART RATE: 56 BPM | OXYGEN SATURATION: 97 % | WEIGHT: 182.65 LBS | BODY MASS INDEX: 26.15 KG/M2 | RESPIRATION RATE: 16 BRPM | DIASTOLIC BLOOD PRESSURE: 50 MMHG | HEIGHT: 70 IN

## 2023-04-21 DIAGNOSIS — N60.92 ATYPICAL LOBULAR HYPERPLASIA OF LEFT BREAST: ICD-10-CM

## 2023-04-21 DIAGNOSIS — Z00.6 RESEARCH STUDY PATIENT: ICD-10-CM

## 2023-04-21 PROCEDURE — 99212 OFFICE O/P EST SF 10 MIN: CPT | Performed by: INTERNAL MEDICINE

## 2023-04-21 PROCEDURE — 99203 OFFICE O/P NEW LOW 30 MIN: CPT | Performed by: INTERNAL MEDICINE

## 2023-04-21 RX ORDER — TAMOXIFEN CITRATE 10 MG/1
5 TABLET ORAL DAILY
Qty: 60 TABLET | Refills: 3 | Status: SHIPPED | OUTPATIENT
Start: 2023-04-21

## 2023-04-21 ASSESSMENT — ENCOUNTER SYMPTOMS
NEUROLOGICAL NEGATIVE: 1
EYES NEGATIVE: 1
MUSCULOSKELETAL NEGATIVE: 1
GASTROINTESTINAL NEGATIVE: 1
RESPIRATORY NEGATIVE: 1
CARDIOVASCULAR NEGATIVE: 1
CONSTITUTIONAL NEGATIVE: 1
PSYCHIATRIC NEGATIVE: 1

## 2023-04-21 ASSESSMENT — FIBROSIS 4 INDEX: FIB4 SCORE: 1.02

## 2023-04-21 NOTE — PROGRESS NOTES
Consult:  Hematology/Oncology      Referring Physician:  ROCKY Means  Primary Care:  ROCKY Means    Diagnosis: Atypical lobular hyperplasia of the left breast    Chief Complaint: Atypical lobular hyperplasia of the left breast    History of Presenting Illness:  India Hernandez is a 43 y.o. premenopausal female who had her first screening mammogram at age 43 which showed 2 adjacent circumscribed masses in the inferior lateral aspect of the left breast.  Ultrasound on on these lesions suggested 1 is a complicated cyst and the other cluster of cysts.  No axillary adenopathy was noted.  3/10/2023 she had ultrasound-guided cyst aspirations and core biopsies done.  Moo showed benign fibrofatty breast tissue and a portion of the fibroadenoma.  Surrounding core biopsy showed focal atypical lobular hyperplasia with areas of stromal hemorrhage.  She has never had any other problems with her breast.  She has no family history of breast or ovarian cancer.  She is otherwise healthy.  Her periods are regular.  She is  1 para 1, first live birth at age 30.      Past Medical History:   Diagnosis Date    Allergy     Varicose vein of leg        Past Surgical History:   Procedure Laterality Date    OTHER      vein removed       Social History     Tobacco Use    Smoking status: Never    Smokeless tobacco: Never   Vaping Use    Vaping Use: Never used   Substance Use Topics    Alcohol use: Yes     Comment: occasionally    Drug use: No        Family History   Problem Relation Age of Onset    Arthritis Mother     No Known Problems Sister     No Known Problems Brother     Cancer Maternal Grandmother         unk type    No Known Problems Maternal Grandfather     Arthritis Paternal Grandfather     Diabetes Paternal Uncle        Allergies as of 2023 - Reviewed 2023   Allergen Reaction Noted    Seasonal  2021         Current Outpatient Medications:     levonorgestrel (MIRENA) 52 mg  "(20 mcg/24 hr) IUD, 1 Each by Intrauterine route one time., Disp: , Rfl:     Multiple Vitamins-Minerals (CENTRUM ADULTS PO), Take 1 tablet by mouth every day., Disp: , Rfl:     Review of Systems:  Review of Systems   Constitutional: Negative.    HENT: Negative.     Eyes: Negative.    Respiratory: Negative.     Cardiovascular: Negative.    Gastrointestinal: Negative.    Genitourinary: Negative.    Musculoskeletal: Negative.    Skin: Negative.    Neurological: Negative.    Endo/Heme/Allergies: Negative.    Psychiatric/Behavioral: Negative.          Physical Exam:  Vitals:    04/21/23 1021   Height: 1.778 m (5' 10\")       DESC; KARNOFSKY SCALE WITH ECOG EQUIVALENT: 100, Fully active, able to carry on all pre-disease performed without restriction (ECOG equivalent 0)    DISTRESS LEVEL: no acute distress    Physical Exam   No physical exam was not performed today  Labs:  Hospital Outpatient Visit on 03/22/2023   Component Date Value Ref Range Status    Vitamin B12 -True Cobalamin 03/22/2023 441  211 - 911 pg/mL Final    WBC 03/22/2023 8.9  4.8 - 10.8 K/uL Final    RBC 03/22/2023 4.43  4.20 - 5.40 M/uL Final    Hemoglobin 03/22/2023 13.9  12.0 - 16.0 g/dL Final    Hematocrit 03/22/2023 40.3  37.0 - 47.0 % Final    MCV 03/22/2023 91.0  81.4 - 97.8 fL Final    MCH 03/22/2023 31.4  27.0 - 33.0 pg Final    MCHC 03/22/2023 34.5  33.6 - 35.0 g/dL Final    RDW 03/22/2023 40.9  35.9 - 50.0 fL Final    Platelet Count 03/22/2023 221  164 - 446 K/uL Final    MPV 03/22/2023 10.5  9.0 - 12.9 fL Final    Neutrophils-Polys 03/22/2023 65.50  44.00 - 72.00 % Final    Lymphocytes 03/22/2023 20.70 (L)  22.00 - 41.00 % Final    Monocytes 03/22/2023 12.10  0.00 - 13.40 % Final    Eosinophils 03/22/2023 1.70  0.00 - 6.90 % Final    Basophils 03/22/2023 0.00  0.00 - 1.80 % Final    Neutrophils (Absolute) 03/22/2023 1.84 (L)  2.00 - 7.15 K/uL Final    Includes immature neutrophils, if present.    Lymphs (Absolute) 03/22/2023 1.84  1.00 - 4.80 " K/uL Final    Monos (Absolute) 03/22/2023 1.08 (H)  0.00 - 0.85 K/uL Final    Eos (Absolute) 03/22/2023 0.15  0.00 - 0.51 K/uL Final    Baso (Absolute) 03/22/2023 0.00  0.00 - 0.12 K/uL Final    Manual Diff Status 03/22/2023 PERFORMED   Final    Folate -Folic Acid 03/22/2023 19.0  >4.0 ng/mL Final    Iron 03/22/2023 162  40 - 170 ug/dL Final    Total Iron Binding 03/22/2023 230 (L)  250 - 450 ug/dL Final    Unsat Iron Binding 03/22/2023 68 (L)  110 - 370 ug/dL Final    % Saturation 03/22/2023 70 (H)  15 - 55 % Final       Imaging:   All listed images below have been independently reviewed by me. I agree with the findings as summarized below:    No results found.     Pathology:      Assessment & Plan:  1.  Focal atypical lobular hyperplasia of the left breast incidentally found with cyst aspiration and core biopsies.  She is not a particularly high risk for other reasons to have breast cancer but based on this finding she is a reasonable candidate for adjuvant endocrine therapy.  Based on the recent low-dose tamoxifen trial this would be an excellent approach for her.  She is agreeable to this and will start 5 mg of tamoxifen daily.  We will see her back in 8 weeks to assess her initial tolerance of therapy.        Any questions and concerns raised by the patient were answered to the best of my ability. Thank you for allowing me to participate in the care for this patient. Please feel free to contact me for any questions or concerns.     Migue Orr M.D.

## 2023-04-21 NOTE — ADDENDUM NOTE
Encounter addended by: Callum Finley on: 4/21/2023 4:24 PM   Actions taken: Charge Capture section accepted

## 2023-06-11 LAB
APOB+LDLR+PCSK9 GENE MUT ANL BLD/T: NOT DETECTED
BRCA1+BRCA2 DEL+DUP + FULL MUT ANL BLD/T: NOT DETECTED
MLH1+MSH2+MSH6+PMS2 GN DEL+DUP+FUL M: NOT DETECTED

## 2023-06-28 ENCOUNTER — APPOINTMENT (OUTPATIENT)
Dept: HEMATOLOGY ONCOLOGY | Facility: MEDICAL CENTER | Age: 43
End: 2023-06-28
Payer: COMMERCIAL

## 2024-04-05 ENCOUNTER — HOSPITAL ENCOUNTER (OUTPATIENT)
Facility: MEDICAL CENTER | Age: 44
End: 2024-04-05
Payer: COMMERCIAL

## 2024-04-05 PROCEDURE — 88175 CYTOPATH C/V AUTO FLUID REDO: CPT

## 2024-04-05 PROCEDURE — 87624 HPV HI-RISK TYP POOLED RSLT: CPT

## 2024-04-05 PROCEDURE — 87625 HPV TYPES 16 & 18 ONLY: CPT

## 2024-04-11 LAB
CYTOLOGIST CVX/VAG CYTO: ABNORMAL
CYTOLOGY CVX/VAG DOC CYTO: ABNORMAL
CYTOLOGY CVX/VAG DOC THIN PREP: ABNORMAL
HPV I/H RISK 4 DNA CVX QL PROBE+SIG AMP: POSITIVE
HPV16 DNA CVX QL PROBE+SIG AMP: NEGATIVE
HPV18+45 E6+E7 MRNA CVX QL NAA+PROBE: POSITIVE
NOTE NL11727A: ABNORMAL
OTHER STN SPEC: ABNORMAL
STAT OF ADQ CVX/VAG CYTO-IMP: ABNORMAL

## 2024-06-03 NOTE — TELEPHONE ENCOUNTER
Need to wait 2 more weeks to retest.   Breath test ordered to be done 4 weeks after completion of treatment   bilateral UE Active ROM was WFL  (within functional limits)

## 2024-07-12 ENCOUNTER — HOSPITAL ENCOUNTER (OUTPATIENT)
Facility: MEDICAL CENTER | Age: 44
End: 2024-07-12
Attending: PHYSICIAN ASSISTANT
Payer: COMMERCIAL

## 2024-07-12 LAB — PATHOLOGY CONSULT NOTE: NORMAL

## 2024-07-12 PROCEDURE — 88305 TISSUE EXAM BY PATHOLOGIST: CPT

## 2025-01-24 ENCOUNTER — HOSPITAL ENCOUNTER (OUTPATIENT)
Facility: MEDICAL CENTER | Age: 45
End: 2025-01-24
Attending: PHYSICIAN ASSISTANT
Payer: COMMERCIAL

## 2025-01-24 PROCEDURE — 88142 CYTOPATH C/V THIN LAYER: CPT

## 2025-02-02 LAB
HPV I/H RISK 1 DNA SPEC QL NAA+PROBE: DETECTED
HPV16 DNA CVX QL PROBE+SIG AMP: NOT DETECTED
HPV18 DNA CVX QL PROBE+SIG AMP: DETECTED
SPECIMEN SOURCE: ABNORMAL
SPECIMEN SOURCE: ABNORMAL
THINPREP PAP, CYTOLOGY NL11781: NORMAL

## 2025-04-18 ENCOUNTER — OFFICE VISIT (OUTPATIENT)
Dept: DERMATOLOGY | Facility: IMAGING CENTER | Age: 45
End: 2025-04-18
Payer: COMMERCIAL

## 2025-04-18 DIAGNOSIS — L30.9 DERMATITIS: ICD-10-CM

## 2025-04-18 PROCEDURE — 99203 OFFICE O/P NEW LOW 30 MIN: CPT | Performed by: STUDENT IN AN ORGANIZED HEALTH CARE EDUCATION/TRAINING PROGRAM

## 2025-04-18 RX ORDER — CLOBETASOL PROPIONATE 0.5 MG/G
OINTMENT TOPICAL
Qty: 45 G | Refills: 1 | Status: SHIPPED | OUTPATIENT
Start: 2025-04-18

## 2025-04-18 NOTE — PROGRESS NOTES
[facility] DERMATOLOGY CLINIC NOTE    Chief Complaint   Patient presents with    Establish Care    Rash        HPI:    India Hernandez is a 45 y.o. female here for evaluation of rash.     HPI: left leg, bilateral elbows   Onset: 6 months   Symptoms: itching, redness, bumps   Aggravating factors: None  Alleviating factors: Lotion   New creams/topicals: None  New medications (up to last 6 months): None  New travel: None  Other exposures: None  Treatments: None     Red dots on chest that bother her.     No other symptomatic (itching, painful, burning) or changing lesions.       Review of Systems: No fevers, chill. Pertinent positives and negatives above.       Medications, Medical History, Surgical History, Family History & Allergies:  Reviewed in the chart, relevant history noted above.       PHYSICAL EXAM  Focused skin exam of face, neck, chest, arms, and legs    - scattered 2-3mm red papules on chest, arms  - erythematous scaly papules and plaques on the elbows, L lower leg        ASSESSMENT & PLAN    # Dermatitis ddx ACD/ICD  Counseled patient on etiology and therapeutic options  - Start clobetasol ointment BID for 4 weeks to affected area  Topical steroids: Chronic use can lead to skin thinning, and stretch marks. To only use as directed. Not longer than 2 weeks at a time.   - Gentle skin care products recommended: Cerave. Detergent:   - Can take otc Zyrtec or Allegra daily as needed for itch  - reviewed itch-scratch cycle and tips to avoid scratching (trim nails short, mittens)    # Cherry angioma  - reassure, no treatment needed.   - cosmetic treatment can be performed with lectrocauter if pt desires, $150 for up to 10 lesions      Return in about 4 weeks (around 5/16/2025) for rash, sooner prn if she wants cherry treated cosmeticaly.        Mora Zeng MD  [facility]

## 2025-05-02 ENCOUNTER — HOSPITAL ENCOUNTER (OUTPATIENT)
Dept: RADIOLOGY | Facility: MEDICAL CENTER | Age: 45
End: 2025-05-02
Attending: STUDENT IN AN ORGANIZED HEALTH CARE EDUCATION/TRAINING PROGRAM
Payer: COMMERCIAL

## 2025-05-02 DIAGNOSIS — Z12.31 ENCOUNTER FOR SCREENING MAMMOGRAM FOR BREAST CANCER: ICD-10-CM

## 2025-05-02 PROCEDURE — 77067 SCR MAMMO BI INCL CAD: CPT

## 2025-05-09 ENCOUNTER — OFFICE VISIT (OUTPATIENT)
Dept: DERMATOLOGY | Facility: IMAGING CENTER | Age: 45
End: 2025-05-09
Payer: COMMERCIAL

## 2025-05-09 DIAGNOSIS — L81.1 MELASMA: ICD-10-CM

## 2025-05-09 DIAGNOSIS — L72.9 CYST OF SKIN: ICD-10-CM

## 2025-05-09 PROCEDURE — 99214 OFFICE O/P EST MOD 30 MIN: CPT | Performed by: STUDENT IN AN ORGANIZED HEALTH CARE EDUCATION/TRAINING PROGRAM

## 2025-05-09 RX ORDER — HYDROQUINONE 40 MG/G
CREAM TOPICAL
Qty: 30 G | Refills: 3 | Status: SHIPPED | OUTPATIENT
Start: 2025-05-09

## 2025-05-09 NOTE — PROGRESS NOTES
[facility] DERMATOLOGY CLINIC NOTE    Chief Complaint   Patient presents with    Follow-Up        HPI:    India Hernandez is a 45 y.o. female here for evaluation of melasma over face for months, no treatment tried, worse in summer.    Also Got an acne cyst on the abdomen and right neck. Pt had an ultrasound to spot on abdomen and was told there was fluid. Was prescribed Abx for 2 weeks that she completed.   Time present: x3 weeks  Painful lesion: Yes, does admit to picking and squeezing because she thought there was something in it.   Itching lesion: No  Enlarging lesion:  smaller  after antibiotics    No other symptomatic (itching, painful, burning) or changing lesions.       Review of Systems: No fevers, chill. Pertinent positives and negatives above.       Medications, Medical History, Surgical History, Family History & Allergies:  Reviewed in the chart, relevant history noted above.       PHYSICAL EXAM  Focused skin exam of face, neck, and abdomen    - hyperpigmented macules and patches on the bilateral cheeks  - L upper abdomen with subcutaneous mobile nodule   - R neck with erythematous nodule      ASSESSMENT & PLAN    # Melasma  Counseled patient on the etiology and treatment options. This is an acquired hyperpigmentation disorder with a chronic nature that is challenging to treat and recurrence is common. Contributing factors include UV radiation/sun exposure, visible light exposure, genetic predisposition, and hormones.   - Strict year-round sun protection with mineral based, 50+ SPF, tinted sunscreen recommended. Sun protective clothing and wide-brimmed hats recommended.     - Start [employer] Hydroquinone 8% / Tret 0.025% / Kojic 1% / Niacinamide 4% / Fluocinolone 0.025% Cream. Hydroquinone - counseled on potential for ochronosis, and to only use as instructed with one month on, one month off cycles. To stop if notice signs of ochronosis. Not to be used during pregnancy or breastfeeding.     # EIC  vs inflamed acne cyst on L upper abdomen, R neck  - improving after antibiotics (unsure which kind)  - discussed longer but lower dose of doxycycline and/or intralesional kenalog. Patient deferred for now and will try over the counter BPO wash as it is somewhat improved after 2 wek course of oral abx   - recommend avoiding further manipulation      Return in about 3 months (around 8/9/2025) for Melasma.        Mora Zeng MD  [facility]